# Patient Record
Sex: FEMALE | Race: WHITE
[De-identification: names, ages, dates, MRNs, and addresses within clinical notes are randomized per-mention and may not be internally consistent; named-entity substitution may affect disease eponyms.]

---

## 2019-09-01 ENCOUNTER — HOSPITAL ENCOUNTER (EMERGENCY)
Dept: HOSPITAL 75 - ER | Age: 29
Discharge: HOME | End: 2019-09-01
Payer: SELF-PAY

## 2019-09-01 VITALS — WEIGHT: 185 LBS | BODY MASS INDEX: 30.82 KG/M2 | HEIGHT: 65 IN

## 2019-09-01 VITALS — DIASTOLIC BLOOD PRESSURE: 82 MMHG | SYSTOLIC BLOOD PRESSURE: 124 MMHG
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
## 2019-09-01 DIAGNOSIS — S42.332A: Primary | ICD-10-CM

## 2019-09-01 DIAGNOSIS — S44.22XA: ICD-10-CM

## 2019-09-01 PROCEDURE — 23605 CLTX PRX HMRL FX MNPJ+-TRACT: CPT

## 2019-09-01 PROCEDURE — 73060 X-RAY EXAM OF HUMERUS: CPT

## 2019-09-01 PROCEDURE — 93041 RHYTHM ECG TRACING: CPT

## 2019-09-01 PROCEDURE — 71045 X-RAY EXAM CHEST 1 VIEW: CPT

## 2019-09-01 PROCEDURE — 73090 X-RAY EXAM OF FOREARM: CPT

## 2019-09-01 NOTE — ED UPPER EXTREMITY
General


Chief Complaint:  Upper Extremity


Stated Complaint:  FALL,L HUMERUS FX


Nursing Triage Note:  


PT VERBALIZED FALL FROM PORCH. STATES LEFT ARM PAIN.


Nursing Sepsis Screen:  No Definite Risk


Source:  patient


Exam Limitations:  no limitations





History of Present Illness


Date Seen by Provider:  Sep 1, 2019


Time Seen by Provider:  19:13


Initial Comments


To ER per [facility]. Patient lives in [facility], her boyfriend 

lives in [facility]. She was visiting him when she was sitting on the railing of 

the porch, fell off. Immediate pain and deformity to the left humerus. Did not 

hit her head and no other injuries. She received 75 g of fentanyl and 2 mg of 

morphine in route with no change in pain. She noticed that she had some tingling

numbness in her pinky finger, middle finger pointer finger and thumb. She denies

hitting her head or any other injuries


Onset:  just prior to arrival


Severity:  moderate


Pain/Injury Location:  left shoulder


Method of Injury:  fell


Modifying Factors:  Worse With Movement





Allergies and Home Medications


Allergies


Coded Allergies:  


     No Known Drug Allergies (Unverified , 19)





Home Medications


Hydrocodone/Acetaminophen 1 Each Tablet, 1 TAB PO Q4-6HR


   Prescribed by: TYLER OSORIO on 19





Patient Home Medication List


Home Medication List Reviewed:  Yes





Review of Systems


Constitutional:  see HPI


EENTM:  see HPI


Respiratory:  no symptoms reported


Cardiovascular:  no symptoms reported


Genitourinary:  no symptoms reported


Musculoskeletal:  no symptoms reported


Skin:  no symptoms reported


Psychiatric/Neurological:  No Symptoms Reported





Past Medical-Social-Family Hx


Patient Social History


Recent Foreign Travel:  No


Contact w/Someone Who Travel:  No


Recent Infectious Disease Expo:  No





Physical Exam


Vital Signs





Vital Signs - First Documented








 19





 18:30 18:54


 


Temp 97.1 


 


Pulse 93 


 


Resp 22 


 


B/P (MAP) 123/90 (101) 


 


Pulse Ox 98 


 


O2 Delivery Room Air 


 


O2 Flow Rate  2.00





  2.00





Capillary Refill : Less Than 3 Seconds


Height, Weight, BMI


Height: 5'5.00"


Weight: 185lbs. oz. 83.890728xq;  BMI


Method:Estimated


General Appearance:  WD/WN, no apparent distress


HEENT:  PERRL/EOMI, normal ENT inspection, other (there are no scalp hematomas 

lacerations or areas of tenderness to palpation)


Neck:  non-tender, full range of motion


Respiratory:  no respiratory distress, no accessory muscle use


Gastrointestinal:  normal bowel sounds, non tender


Shoulder:  normal inspection, limited ROM (the left humerus has obvious 

deformity. The radial pulses +2 in strength. She is able to give the thumbs up 

sign the okay sign but has difficulty extending the wrist. Complains of persis

tent tingling to the thumb and pointer finger. )


Elbow/Forearm:  normal inspection, non-tender, Left


Wrist:  Yes normal inspection, Yes non-tender


Hand:  normal inspection, non-tender, Left


Neurologic/Psychiatric:  alert, normal mood/affect, oriented x 3


Skin:  normal color





Procedures/Interventions


Procedure:  HUMERUS FX





Progress/Results/Core Measures


Results/Orders


My Orders





Orders - TYLER OSORIO


Humerus, Left, 2 Views (19 18:29)


Hydromorphone Injection (Dilaudid Inject (19 18:30)


Hydromorphone Injection (Dilaudid Inject (19 18:30)


Fentanyl  Injection (Sublimaze Injection (19 18:29)


Etomidate Injection (Amidate Injection) (19 19:00)


Ns Iv 500 Ml (Sodium Chloride 0.9%) (19 19:00)


Ondansetron Injection (Zofran Injectio (19 19:00)


Ns Iv 1000 Ml (Sodium Chloride 0.9%) (19 18:41)


Chest 1 View, Ap/Pa Only (19 19:27)


Rx-Hydrocodone/Apap 5-325 Mg (Rx-Vicodin (19 19:30)


Fentanyl  Injection (Sublimaze Injection (19 20:00)


Forearm, Left, 2 Views (19 20:05)





Medications Given in ED





Current Medications








 Medications  Dose


 Ordered  Sig/Jose Angel


 Route  Start Time


 Stop Time Status Last Admin


Dose Admin


 


 Acetaminophen/


 Hydrocodone Bitart  1 ea  Q4H  PRN


 PO  19 19:30


    19 19:34


1 EA


 


 Etomidate  10 mg  ONCE  ONCE


 IV  19 19:00


 19 19:01 DC 19 18:55


10 MG


 


 Fentanyl Citrate  50 mcg  ONCE  ONCE


 IVP  19 20:00


 19 20:01 DC 19 20:07


50 MCG


 


 Fentanyl Citrate  75 mcg  ONCE  ONCE


 IVP  19 18:45


 19 18:46 DC 19 18:40


75 MCG


 


 Hydromorphone HCl  0.5 mg  ONCE  ONCE


 IV  19 18:30


 19 18:31 DC 19 18:31


0.5 MG


 


 Hydromorphone HCl  1 mg  ONCE  ONCE


 IV  19 18:30


 19 18:31 DC 19 18:34


1 MG


 


 Ondansetron HCl  4 mg  ONCE  ONCE


 IVP  19 19:00


 19 19:01 DC 19 19:12


4 MG








Vital Signs/I&O











 19





 18:30 18:54


 


Temp 97.1 


 


Pulse 93 


 


Resp 22 


 


B/P (MAP) 123/90 (101) 


 


Pulse Ox 98 


 


O2 Delivery Room Air Nasal Cannula


 


O2 Flow Rate  2.00





  2.00














Blood Pressure Mean:                    101











Diagnostic Imaging





   Diagonstic Imaging:  Xray


Comments


NAME:   BRANDON HARDY


[facility] REC#:   [id number]


ACCOUNT#:   [id number]


PT STATUS:   REG ER


:   1990


PHYSICIAN:   TYLER OSORIO


ADMIT DATE:   19/ER


                                  ***Signed***


Date of Exam:19





HUMERUS, LEFT, 2 VIEWS








INDICATION: Fall. Left arm pain.





EXAMINATION: Two views of the left arm were obtained.





FINDINGS: Transverse fracture through the distal shaft of the


humerus with angulation. There is approximately 2 cm of


overriding.





IMPRESSION: There is an overriding angulated fracture of the


distal shaft of the left humerus. 





Dictated by: 





  Dictated on workstation # [id number]








Dict:   19


Trans:   19


E [id number]





Interpreted by:     MACEY GAMEZ MD


Electronically signed by: MACEY GAMEZ MD 19


NAME:   BRANDON HARDY [facility] REC#:   [id number]


ACCOUNT#:   [id number]


PT STATUS:   REG ER


:   1990


PHYSICIAN:   NANCY TRIVEDI MD


ADMIT DATE:   19/ER


                                  ***Signed***


Date of Exam:19





HUMERUS, LEFT, 2 VIEWS








INDICATION: Left humerus post reduction.





EXAMINATION: Two views of the left humerus were obtained.





FINDINGS: There is considerably improved alignment compared to


the earlier study. There is less than 1 cm of displacement and


minimal angulation.





IMPRESSION: Improved alignment. There still remains approximately


1 cm of anterior displacement of the distal fragment in


relationship to the proximal shaft. 





Dictated by: 





  Dictated on workstation # [id number]








Dict:   19


Trans:   19


E [id number]





Interpreted by:     MACEY GAMEZ MD


Electronically signed by: MACEY GAMEZ MD 19





Departure


Communication (Admissions)


- we gave an additional 1.5 mg of Dilaudid and 75 g of fentanyl here with 

very poor pain control. There is associated muscle spasm with the deformity in 

the overriding fracture fragments. Discussed with the patient the need to reduce

this. We gave a total of 10 mg of etomidate, we were then able to easily reduce 

the overriding fractures with simple traction, she was placed in a stirrup style

splint of the upper arm, placed in a sling and reduction films were obtained. 

She remained neurovascularly intact afterwards, neuro exam will follow when she 

is more awake.


-feeling much better, radial pulses 2+2. She is able to extend her pointer 

finger, reports a tingling sensation in the thumb pointer and middle fingers 

still, the ring and little finger feel normal. Minimal ability to extend the 

wrist still.


-Spoke with Dr Schwab, agrees with radial nerve palsy. He'll follow-up in 

the clinic, most of these resolve on their own.





Impression





   Primary Impression:  


   Humerus fracture


   Qualified Codes:  S42.332A - Displaced oblique fracture of shaft of humerus, 

   left arm, initial encounter for closed fracture


   Additional Impression:  


   Radial nerve injury


   Qualified Codes:  S44.22XA - Injury of radial nerve at upper arm level, left 

   arm, initial encounter


Disposition:   HOME, SELF-CARE


Condition:  Stable





Departure-Patient Inst.


Decision time for Depature:  19:26


Referrals:  


RODRICK POWELL MD





NO,LOCAL PHYSICIAN (PCP)


Primary Care Physician








OGDEN,JOHN T MD SCHWAB,TERRY D MD STRINGER,ROBERT F DO


Patient Instructions:  Upper Arm Fracture





Add. Discharge Instructions:  


1. Leave the splint on at all times. Wear the sling at all times. Pain 

medication as directed. Return to ER for any concerns. Call orthopedic surgeon 

of your choosing on Tuesday for an appointment to be seen for follow-up. All 

discharge instructions reviewed with patient and/or family. Voiced 

understanding.


Scripts


Hydrocodone/Acetaminophen (Norco 5-325 Tablet) 1 Each Tablet


1 TAB PO Q4-6HR for Pain MDD 10 TABS for 7 Days, #30 TAB


   Prov: TYLER OSORIO         19


Work/School Note:  Work Release Form   Date Seen in the Emergency Department:  

Sep 1, 2019


   Return to Work:  Sep 1, 2019


   Restrictions:  Need Release from Doctor











TYLER OSORIO              Sep 1, 2019 19:16

## 2019-09-01 NOTE — DIAGNOSTIC IMAGING REPORT
INDICATION: Fall. Left arm pain.



EXAMINATION: Two views of the left arm were obtained.



FINDINGS: Transverse fracture through the distal shaft of the

humerus with angulation. There is approximately 2 cm of

overriding.



IMPRESSION: There is an overriding angulated fracture of the

distal shaft of the left humerus. 



Dictated by: 



  Dictated on workstation # [id number]

## 2019-09-01 NOTE — DIAGNOSTIC IMAGING REPORT
INDICATION: Left humerus post reduction.



EXAMINATION: Two views of the left humerus were obtained.



FINDINGS: There is considerably improved alignment compared to

the earlier study. There is less than 1 cm of displacement and

minimal angulation.



IMPRESSION: Improved alignment. There still remains approximately

1 cm of anterior displacement of the distal fragment in

relationship to the proximal shaft. 



Dictated by: 



  Dictated on workstation # [id number]

## 2019-09-01 NOTE — DIAGNOSTIC IMAGING REPORT
INDICATION: Fall. Left arm pain.



EXAMINATION: Two views of the left forearm were obtained.



FINDINGS: No fracture, dislocation or other abnormality.



IMPRESSION: Normal left forearm. 



Dictated by: 



  Dictated on workstation # [id number]

## 2019-09-01 NOTE — DIAGNOSTIC IMAGING REPORT
INDICATION: Humeral fracture.



EXAMINATION: Single view of the chest was obtained.



FINDINGS: Normal heart size and vascularity. The lungs are clear.

There is no effusion or pneumothorax. There is no bony

abnormality.



IMPRESSION: Normal chest. 



Dictated by: 



  Dictated on workstation # [id number]